# Patient Record
Sex: FEMALE
[De-identification: names, ages, dates, MRNs, and addresses within clinical notes are randomized per-mention and may not be internally consistent; named-entity substitution may affect disease eponyms.]

---

## 2022-05-02 PROBLEM — Z00.00 ENCOUNTER FOR PREVENTIVE HEALTH EXAMINATION: Status: ACTIVE | Noted: 2022-05-02

## 2022-05-09 ENCOUNTER — APPOINTMENT (OUTPATIENT)
Age: 67
End: 2022-05-09
Payer: MEDICARE

## 2022-05-09 VITALS
OXYGEN SATURATION: 97 % | RESPIRATION RATE: 16 BRPM | WEIGHT: 152 LBS | SYSTOLIC BLOOD PRESSURE: 118 MMHG | BODY MASS INDEX: 30.64 KG/M2 | HEIGHT: 59 IN | TEMPERATURE: 97.3 F | HEART RATE: 108 BPM | DIASTOLIC BLOOD PRESSURE: 70 MMHG

## 2022-05-09 PROCEDURE — 99204 OFFICE O/P NEW MOD 45 MIN: CPT

## 2022-05-10 NOTE — ASSESSMENT
[FreeTextEntry1] : 67F PMHx HTN, DM, HLD, BERONICA presenting to GI clinic as a referral from Dr Castro (GI) for consideration of EMR of large gastric polyps. \par \par #Gastric Polyps \par -EGD (4/12/22) Acute gastritis, nonbleeding. 4 large pedunculated gastric antrum inflammatory appearing polyps ranging from 15 mm to 25 mm in size, not removed or biopsied. \par -Will defer on EGD evaluation of gastric polyps pending below treatment of colonic mass\par \par #Tubulovillous colonic mass \par Per outpatient GI physician, Dr Castro, with long-standing history of large ascending colon mass, s/p multiple previous attempts since 2012 via EMR to remove lesion, unsuccessful. Most recent colonoscopy in 2022 now with nearly obstructing lesion, path demonstrating tubulovillous adenoma however suspicious for high grade dysplasia and planned for resection. \par - Colonoscopy (4/12/22) - 2nd degree hemorrhoids, diverticulosis, large 6 cm nearly obstructing proximal ascending colon mass located at prior tattoo site of prior polypectomy, attempts from last colonoscopy in 2016. (Path - tubulovillous) \par - Reviewed CT Chest/Abdomen/Pelvis (4/26/22) with no e/o metastatic disease\par - Tentatively scheduled for resection of colonic mass on 5/17/22 with Dr Vale Caicedo \par \par RTC in 2-3 months post surgery to plan for EGD \par \par Emma Ruano, \par Gastroenterology Fellow\par

## 2022-05-10 NOTE — HISTORY OF PRESENT ILLNESS
[de-identified] : HPI- 67F PMHx HTN, DM, HLD, BERONICA presenting to GI clinic as a referral from Dr Castro (GI) for consideration of EMR of large gastric polyps. \par \par Patient reports recent EGD/Colonoscopy with Dr Castro for relatively new BERONICA noted on bloodwork. Noted on colonoscopy (results noted below) to have a large 5 cm nearly obstructing proximal ascending colon mass. EGD notable for multiple large gastric polyps for which she was recommended to see Dr Arellano for consideration of EMR of those lesions. As far as her workup of her colonic mass, she was sent to see a surgeon, Dr Vale Caicedo ([facility]) for consideration of resection. Tentatively planned for surgery on 5/17/22. In the interim, had a CT Chest/Abdomen/Pelvis performed on 4/26/22, the results of which revealed a large soft tissue mass (5.4 x 4.4 cm) involving the ascending colon, adjacent to the ileocecal valve and extending at least 5 cm distally. Findings c/w primary neoplasm. Lack of adjacent adenopathy or clear metastatic disease suggestive of low-grade neoplasm such as villous tumor. Indeterminate left adrenal nodule. Low density lesions in the kidney. \par \par Colonoscopy (4/12/22) - 2nd degree hemorrhoids, diverticulosis, large 6 cm nearly obstructing proximal ascending colon mass located at prior tattoo site of prior polypectomy, attempts from last colonoscopy in 2016. (Path - tubulovillous) \par \par Reports previous colonoscopy performed in 2016, same polyp has been attempted to be removed via EMR since 2012. \par \par EGD (4/12/22) Acute gastritis, nonbleeding. 4 large pedunculated gastric antrum inflammatory appearing polyps ranging from 15 mm to 25 mm in size, not removed or biopsied. \par \par Referred by - Dr Castro (GI)\par \par PMHx - HTN, DM, HLD, BERONICA\par PSHx - 2 hip replacements (b/l, 2013), Cholecystectomy (1982), Back surgery (2010)\par Rx - Amlodipine 10 mg daily, Ferrous sulfate 325 mg daily, Fluticasone 50 mcg nasal spray, Glipizide 10 mg daily, Metformin 500 mg BID, ASA 81 mg daily, Simvastatin 20mg daily, sitagliptin 100 mg daily\par Supplements/herbs/OTC - None\par A/C or NSAIDs? - None\par FMHx - Sister - CRC (<60 at time of diagnosis)\par Allergies - NKDA\par EtOH - Denies\par Smoking - denies\par Drugs - Denies\par Diet - Occasional red meat\par \par

## 2022-05-10 NOTE — REVIEW OF SYSTEMS
Spoke with pt & her ; she would like to proceed with getting approved for a gel injection. I explained to her to that someone will check on her insurance and call her back.    [Negative] : Heme/Lymph [Constipation] : constipation [As Noted in HPI] : as noted in HPI [Abdominal Pain] : no abdominal pain [Vomiting] : no vomiting [Diarrhea] : no diarrhea [Heartburn] : no heartburn [Melena] : no melena

## 2022-08-10 ENCOUNTER — NON-APPOINTMENT (OUTPATIENT)
Age: 67
End: 2022-08-10

## 2022-08-25 ENCOUNTER — RESULT REVIEW (OUTPATIENT)
Age: 67
End: 2022-08-25

## 2022-08-25 ENCOUNTER — APPOINTMENT (OUTPATIENT)
Age: 67
End: 2022-08-25

## 2022-08-25 PROCEDURE — 43251 EGD REMOVE LESION SNARE: CPT

## 2022-08-25 PROCEDURE — 43239 EGD BIOPSY SINGLE/MULTIPLE: CPT | Mod: 59

## 2022-09-09 DIAGNOSIS — A04.8 OTHER SPECIFIED BACTERIAL INTESTINAL INFECTIONS: ICD-10-CM

## 2022-09-09 RX ORDER — DEXTRIN 3 G/3.8 G
525 POWDER (GRAM) ORAL
Qty: 1 | Refills: 1 | Status: ACTIVE | COMMUNITY
Start: 2022-09-09 | End: 1900-01-01

## 2022-09-09 RX ORDER — OMEPRAZOLE 40 MG/1
40 CAPSULE, DELAYED RELEASE ORAL TWICE DAILY
Qty: 28 | Refills: 0 | Status: ACTIVE | COMMUNITY
Start: 2022-09-09 | End: 1900-01-01

## 2022-09-09 RX ORDER — DOXYCYCLINE HYCLATE 100 MG/1
100 CAPSULE ORAL
Qty: 28 | Refills: 0 | Status: ACTIVE | COMMUNITY
Start: 2022-09-09 | End: 1900-01-01

## 2022-09-09 RX ORDER — METRONIDAZOLE 250 MG/1
250 TABLET ORAL
Qty: 56 | Refills: 0 | Status: ACTIVE | COMMUNITY
Start: 2022-09-09 | End: 1900-01-01